# Patient Record
Sex: FEMALE | Race: WHITE | Employment: UNEMPLOYED | URBAN - METROPOLITAN AREA
[De-identification: names, ages, dates, MRNs, and addresses within clinical notes are randomized per-mention and may not be internally consistent; named-entity substitution may affect disease eponyms.]

---

## 2019-06-14 ENCOUNTER — TRANSFERRED RECORDS (OUTPATIENT)
Dept: HEALTH INFORMATION MANAGEMENT | Facility: CLINIC | Age: 39
End: 2019-06-14

## 2019-07-16 ENCOUNTER — TRANSFERRED RECORDS (OUTPATIENT)
Dept: HEALTH INFORMATION MANAGEMENT | Facility: CLINIC | Age: 39
End: 2019-07-16

## 2019-08-06 ENCOUNTER — TRANSFERRED RECORDS (OUTPATIENT)
Dept: HEALTH INFORMATION MANAGEMENT | Facility: CLINIC | Age: 39
End: 2019-08-06

## 2019-08-19 ENCOUNTER — PRE VISIT (OUTPATIENT)
Dept: ORTHOPEDICS | Facility: CLINIC | Age: 39
End: 2019-08-19

## 2019-08-19 NOTE — TELEPHONE ENCOUNTER
RECORDS RECEIVED FROM: Left Knee revision partial//left knee pain//ref by Dr. Kent//[facility] November 2018/Bone Scan of the knee. recs faxed to HIM 8/16 se   DATE RECEIVED: 8/19   NOTES STATUS DETAILS   OFFICE NOTE from referring provider Care everywhere    OFFICE NOTE from other specialist Internal    DISCHARGE SUMMARY from hospital N/A    DISCHARGE REPORT from the ER N/A    OPERATIVE REPORT Care Everywhere 11/23/18    MEDICATION LIST Internal    MRI internal    CT SCAN n/a    XRAYS (IMAGES & REPORTS) recieved [facility]  2019 2018 2014 2007

## 2019-09-10 DIAGNOSIS — M17.12 ARTHRITIS OF LEFT KNEE: Primary | ICD-10-CM

## 2019-09-11 ENCOUNTER — ANCILLARY PROCEDURE (OUTPATIENT)
Dept: GENERAL RADIOLOGY | Facility: CLINIC | Age: 39
End: 2019-09-11
Attending: ORTHOPAEDIC SURGERY
Payer: COMMERCIAL

## 2019-09-11 ENCOUNTER — OFFICE VISIT (OUTPATIENT)
Dept: ORTHOPEDICS | Facility: CLINIC | Age: 39
End: 2019-09-11
Payer: COMMERCIAL

## 2019-09-11 VITALS — BODY MASS INDEX: 30.52 KG/M2 | HEIGHT: 71 IN | WEIGHT: 218 LBS

## 2019-09-11 DIAGNOSIS — M17.12 ARTHRITIS OF LEFT KNEE: ICD-10-CM

## 2019-09-11 DIAGNOSIS — Z96.652 HISTORY OF LEFT KNEE REPLACEMENT: Primary | ICD-10-CM

## 2019-09-11 RX ORDER — DULAGLUTIDE 1.5 MG/.5ML
INJECTION, SOLUTION SUBCUTANEOUS
Refills: 6 | COMMUNITY
Start: 2019-08-20

## 2019-09-11 RX ORDER — PROPRANOLOL HYDROCHLORIDE 10 MG/1
10 TABLET ORAL
COMMUNITY
Start: 2016-12-07

## 2019-09-11 RX ORDER — DOXYCYCLINE 100 MG/1
CAPSULE ORAL
Refills: 0 | COMMUNITY
Start: 2019-08-27

## 2019-09-11 ASSESSMENT — ENCOUNTER SYMPTOMS
HOARSE VOICE: 1
DOUBLE VISION: 0
SINUS PAIN: 1
ARTHRALGIAS: 1
EYE PAIN: 1
COUGH DISTURBING SLEEP: 1
POSTURAL DYSPNEA: 0
EYE WATERING: 0
HEMOPTYSIS: 0
WHEEZING: 1
TROUBLE SWALLOWING: 0
JOINT SWELLING: 1
COUGH: 1
NECK MASS: 0
SMELL DISTURBANCE: 1
NECK PAIN: 1
SPUTUM PRODUCTION: 0
MUSCLE WEAKNESS: 1
MUSCLE CRAMPS: 1
DYSPNEA ON EXERTION: 0
SINUS CONGESTION: 0
STIFFNESS: 1
BACK PAIN: 1
SNORES LOUDLY: 0
MYALGIAS: 1
SHORTNESS OF BREATH: 0
SORE THROAT: 0
TASTE DISTURBANCE: 0

## 2019-09-11 ASSESSMENT — KOOS JR: HOW SEVERE IS YOUR KNEE STIFFNESS AFTER FIRST WAKING IN MORNING: SEVERE

## 2019-09-11 ASSESSMENT — ACTIVITIES OF DAILY LIVING (ADL): FUNCTION,_DAILY_LIVING_SCORE: 26.47

## 2019-09-11 ASSESSMENT — MIFFLIN-ST. JEOR: SCORE: 1769.97

## 2019-09-11 NOTE — NURSING NOTE
"Reason For Visit:   Chief Complaint   Patient presents with     Consult     Left knee pain after surgery. Revision partial knee.        Primary MD: Yessica Toledo  Referring MD: Robert Kent MD          Ht 1.803 m (5' 11\")   Wt 98.9 kg (218 lb)   LMP 05/18/2012   BMI 30.40 kg/m      Pain Assessment  Patient Currently in Pain: Yes  0-10 Pain Scale: 7  Primary Pain Location: Knee  Pain Descriptors: Discomfort, Tightness, Sharp    Current Outpatient Medications   Medication     acetaminophen (TYLENOL) 500 MG tablet     blood glucose monitoring (JD MICROLET) lancets     blood glucose test strip     gabapentin (NEURONTIN) 600 MG tablet     glucagon (GLUCAGON EMERGENCY) 1 MG injection     hydrOXYzine (VISTARIL) 25 MG capsule     ibuprofen (ADVIL,MOTRIN) 200 MG tablet     insulin glargine (LANTUS SOLOSTAR) 100 UNIT/ML PEN     insulin lispro (HUMALOG VIAL) 100 UNIT/ML soln     insulin pen needle 31G X 8 MM     insulin syringes, disposable, U-100 1 ML MISC     lisinopril (PRINIVIL,ZESTRIL) 2.5 MG tablet     metformin (GLUMETZA) 1000 MG (MOD) 24 hr tablet     metoclopramide (REGLAN) 10 MG tablet     ondansetron (ZOFRAN) 4 MG tablet     topiramate (TOPAMAX) 25 MG tablet     buPROPion (WELLBUTRIN) 75 MG tablet     Hyoscyamine Sulfate (LEVSIN PO)     insulin glargine (LANTUS VIAL) 100 UNIT/ML soln     opium-belladonna (B&O SUPPRETTES) 30-16.2 MG suppository     Phenazopyridine HCl (PYRIDIUM PO)     risperidone (RISPERDAL) 0.25 MG tablet     SUMAtriptan (IMITREX) 100 MG tablet     tamsulosin (FLOMAX) 0.4 MG 24 hr capsule     TRULICITY 1.5 MG/0.5ML pen     No current facility-administered medications for this visit.           Allergies   Allergen Reactions     Bee Venom Anaphylaxis     Bactrim [Sulfamethoxazole W/Trimethoprim] Hives     Penicillin G Hives     Sulfa Drugs Hives     Zoloft [Sertraline Hcl] Hives     Compazine [Prochlorperazine] Anxiety     12/12/2014 States \"not anymore\"           Georgina " TRISTAN Higuera

## 2019-09-11 NOTE — PROGRESS NOTES
[facility], Orthopaedic Surgery, Arthritis, Hip and Knee Replacement    Kimberly Webber MRN# [id number]   Age: 38 year old YOB: 1980     Requesting physician: Robert Kent MD              History of Present Illness:   Kimberly Webber is a 38 year old year old female who presents today for evaluation and management of left painful knee replacement - 2018 in [place].    She has had many issues with her left knee replacement.  She initially underwent a patellofemoral arthroplasty in 2014 in [place].  This was converted to a total knee replacement in 2018.  She notes that initially she did well following the knee replacement.  Then about 1 month after the knee replacement she sustained a fall onto the knee.  She has had pain in that setting since that time.  She had multiple aspirations that did not show any infection.  She had a bone scan obtained and [place] but she is not certain of those results.  She localizes the pain diffusely throughout the knee.  She has persistent swelling in the knee.  She has difficulty walking due to the pain in the knee.         Past Medical History:     Patient Active Problem List   Diagnosis     Chondromalacia of patella     Diabetes mellitus (H)     Renal stone     Abdominal pain     Acute post-operative pain     Arthritis of left knee     GARCIA (nonalcoholic steatohepatitis)     Abdominal pain, epigastric     Nausea with vomiting     Intractable abdominal pain     Interstitial cystitis     Bipolar affective disorder (H)     Hypertension     Past Medical History:   Diagnosis Date     Anxiety      Bipolar 2 disorder (H)      Chronic back pain      Chronic pain      Depression      Diabetes mellitus (H)     type 2     Gastro-oesophageal reflux disease      Hyperlipidemia      Migraines      Nephrolithiasis      Obesity      PTSD (post-traumatic stress disorder)      Seasonal allergies      UTI (lower urinary tract infection)      Prior history of blood clot: yes,  FVL - prior DVT  Prior history of bleeding problems: none  Prior history of anesthetic complications: none  Currently on opioids:  none  History of Diabetes (if so, recent A1c): yes - 7.2 a1c           Past Surgical History:     Past Surgical History:   Procedure Laterality Date     APPENDECTOMY       ARTHROPLASTY PATELLO-FEMORAL (KNEE)  5/27/2011    Procedure:ARTHROPLASTY PATELLO-FEMORAL (KNEE); Right Knee Arthroscopy, Patella Resurfacing; Surgeon:MYRA HAMILTON; [facility]     ARTHROPLASTY PATELLO-FEMORAL (KNEE)  5/18/2012    Procedure:ARTHROPLASTY PATELLO-FEMORAL (KNEE); Right Knee Diagnostic Arthroscopy, Right Knee Guerline with Femoral SurfaceOnly; Osteochondral Autograft x2; Lateral Retinacular Lengthening         ; Surgeon:MYRA HAMILTON; [facility]     ARTHROPLASTY PATELLO-FEMORAL (KNEE)  8/23/2013    Procedure: ARTHROPLASTY PATELLO-FEMORAL (KNEE);  left knee diagnostic arthroscopy, patellofemoral arthroplasty;  Surgeon: Myra Hamilton MD;  [facility]     ARTHROSCOPY KNEE  8/23/2013    Procedure: ARTHROSCOPY KNEE;;  Surgeon: Myra Hamilton MD;  [facility]     ARTHROSCOPY SHOULDER ROTATOR CUFF REPAIR      right     CHOLECYSTECTOMY       COLONOSCOPY       cystoureteroscopy[       GENITOURINARY SURGERY  10/24/2014    cystoscopy     HYSTERECTOMY       KNEE SURGERY      11 surgeries (9 on right, 2 on left)     MYRINGOTOMY       NISSEN FUNDOPLICATION       RELEASE CARPAL TUNNEL      right     upper gastrointestinal endoscopy[              Social History:     Social History     Socioeconomic History     Marital status:      Spouse name: Not on file     Number of children: Not on file     Years of education: Not on file     Highest education level: Not on file   Occupational History     Not on file   Social Needs     Financial resource strain: Not on file     Food insecurity:     Worry: Not on file     Inability: Not on file     Transportation needs:     Medical: Not on  "file     Non-medical: Not on file   Tobacco Use     Smoking status: Current Every Day Smoker     Packs/day: 1.00     Years: 10.00     Pack years: 10.00     Types: Cigarettes     Smokeless tobacco: Never Used   Substance and Sexual Activity     Alcohol use: No     Drug use: No     Sexual activity: Not on file   Lifestyle     Physical activity:     Days per week: Not on file     Minutes per session: Not on file     Stress: Not on file   Relationships     Social connections:     Talks on phone: Not on file     Gets together: Not on file     Attends Taoism service: Not on file     Active member of club or organization: Not on file     Attends meetings of clubs or organizations: Not on file     Relationship status: Not on file     Intimate partner violence:     Fear of current or ex partner: Not on file     Emotionally abused: Not on file     Physically abused: Not on file     Forced sexual activity: Not on file   Other Topics Concern     Not on file   Social History Narrative     Not on file       Smoking: Smoking 1 PPD  Lives in [place]           Family History:     No family history on file.           Medications:     Current Outpatient Medications   Medication Sig     acetaminophen (TYLENOL) 500 MG tablet      blood glucose monitoring (JD MICROLET) lancets USE TO TEST BLOOD GLUCOSE FOUR TIMES A DAY     blood glucose test strip Contour monitor strips, test before meals and bedtime.     gabapentin (NEURONTIN) 600 MG tablet Take 1 tablet 3 times daily     glucagon (GLUCAGON EMERGENCY) 1 MG injection INJECT 1 MG INTO THE MUSCLE ONE TIME AS NEEDED FOR LOW BLOOD SUGAR.     hydrOXYzine (VISTARIL) 25 MG capsule Take 25 mg by mouth     ibuprofen (ADVIL,MOTRIN) 200 MG tablet      insulin glargine (LANTUS SOLOSTAR) 100 UNIT/ML PEN      insulin lispro (HUMALOG VIAL) 100 UNIT/ML soln      insulin pen needle 31G X 8 MM      insulin syringes, disposable, U-100 1 ML MISC 31 gauge, 5/16\" needle, One injection daily     " lisinopril (PRINIVIL,ZESTRIL) 2.5 MG tablet Take 2.5 mg by mouth 2 times daily      metformin (GLUMETZA) 1000 MG (MOD) 24 hr tablet Take 1,000 mg by mouth 2 times daily.     metoclopramide (REGLAN) 10 MG tablet Take 1 tablet (10 mg) by mouth 3 times daily as needed (nausea and vomiting)     ondansetron (ZOFRAN) 4 MG tablet Take 1 tablet (4 mg) by mouth every 8 hours as needed for nausea     propranolol (INDERAL) 10 MG tablet Take 10 mg by mouth     topiramate (TOPAMAX) 25 MG tablet Take 75 mg by mouth 2 times daily     buPROPion (WELLBUTRIN) 75 MG tablet Take 300 mg by mouth daily      doxycycline monohydrate (MONODOX) 100 MG capsule TAKE ONE (1) Capsule BY MOUTH TWICE DAILY FOR 7 DAYS     Hyoscyamine Sulfate (LEVSIN PO) Take 0.125 mg by mouth every 4 hours as needed     insulin glargine (LANTUS VIAL) 100 UNIT/ML soln Inject 85 Units Subcutaneous     opium-belladonna (B&O SUPPRETTES) 30-16.2 MG suppository Place 1 suppository (30 mg) rectally every 6 hours as needed for moderate pain (Patient not taking: Reported on 9/11/2019)     Phenazopyridine HCl (PYRIDIUM PO) Take 200 mg by mouth 3 times daily as needed for irritation     risperidone (RISPERDAL) 0.25 MG tablet Take 4 mg by mouth every evening Patient states correct daily dose is 3 mg.     SUMAtriptan (IMITREX) 100 MG tablet TAKE 1 TABLET BY MOUTH ONE TIME AS NEEDED FOR MIGRAINE. DOSE MAY BE REPEATED AFTER 2 HOURS. DO NOT EXCEED 200 MG IN 24 HOURS     tamsulosin (FLOMAX) 0.4 MG 24 hr capsule Take by mouth daily     TRULICITY 1.5 MG/0.5ML pen Inject 1.5 mg under the skin one time a week.     No current facility-administered medications for this visit.             Review of Systems:   A comprehensive 10 point review of systems (constitutional, ENT, cardiac, peripheral vascular, lymphatic, respiratory, GI, , Musculoskeletal, skin, Neurological) was performed and found to be negative except as described in this note.     Also see intake form completed by  "patient.             Physical Exam:     EXAMINATION pertinent findings:   VITAL SIGNS: Height 1.803 m (5' 11\"), weight 98.9 kg (218 lb), last menstrual period 05/18/2012.  Body mass index is 30.4 kg/m .  GEN: AOx3, cooperative, no distress  RESP: non labored breathing   ABD: benign   SKIN: grossly normal   LYMPHATIC: grossly normal   NEURO: grossly normal   VASCULAR: satisfactory perfusion of all extremities  MUSCULOSKELETAL:   She walks with an antalgic gait.  Examination of the left knee demonstrates a well-healed incision.  There is no surrounding redness.  The knee range of motion is from 10 to 95 degrees.  She has mild pain with knee range of motion.  She has diffuse knee tenderness to palpation but no specific proximal tibial or distal femoral tenderness.  Her knee is otherwise stable to varus and valgus stress.  She has no pain with hip range of motion.  Extensor mechanism is fully intact with no extensor lag.             Data:   Imaging:   X-rays demonstrate well fixed well-positioned left cemented knee replacement implants.  Right knee replacement also well fixed well-positioned with slight valgus alignment.           Assessment and Plan:   Assessment:  Kimberly is a very pleasant 38-year-old woman with a painful left knee replacement.  We discussed possible etiologies and possible management options.  At this point there is no concern for infection based on the aspiration results in care everywhere.  There is no evidence of loosening on the plain radiographs.  We discussed that a bone scan would be slightly unreliable in the setting given that.  Recommended she continue to work on strengthening and stretching.  I am hopeful that with time things will continue to get better.  If not I will see her back in clinic 2 years after surgery and we would consider repeating a bone scan.  At this point I do not believe that further surgery would provide any benefit and may in fact make her worse without a clear " pathology.  She will let us know if she has any questions or concerns from today's visit.      Jackson Godinez M.D.     Arthritis and Joint Replacement  Department of Orthopaedic Surgery, [facility]  [email or web address]  [phone] (pager)           Review of Systems:       Answers for HPI/ROS submitted by the patient on 9/11/2019   General Symptoms: No  Skin Symptoms: No  HENT Symptoms: Yes  EYE SYMPTOMS: Yes  HEART SYMPTOMS: No  LUNG SYMPTOMS: Yes  INTESTINAL SYMPTOMS: No  URINARY SYMPTOMS: No  GYNECOLOGIC SYMPTOMS: No  BREAST SYMPTOMS: No  SKELETAL SYMPTOMS: Yes  BLOOD SYMPTOMS: No  NERVOUS SYSTEM SYMPTOMS: No  MENTAL HEALTH SYMPTOMS: No  Ear pain: No  Ear discharge: No  Hearing loss: No  Tinnitus: No  Nosebleeds: No  Congestion: No  Sinus pain: Yes  Trouble swallowing: No   Voice hoarseness: Yes  Mouth sores: No  Sore throat: No  Tooth pain: No  Gum tenderness: No  Bleeding gums: No  Change in taste: No  Change in sense of smell: Yes  Dry mouth: No  Hearing aid used: No  Neck lump: No  Eye pain: Yes  Vision loss: No  Dry eyes: Yes  Watery eyes: No  Eye bulging: Yes  Double vision: No  Flashing of lights: No  Spots: No  Cough: Yes  Sputum or phlegm: No  Coughing up blood: No  Difficulty breating or shortness of breath: No  Snoring: No  Wheezing: Yes  Difficulty breathing on exertion: No  Nighttime Cough: Yes  Difficulty breathing when lying flat: No  Back pain: Yes  Muscle aches: Yes  Neck pain: Yes  Swollen joints: Yes  Joint pain: Yes  Bone pain: Yes  Muscle cramps: Yes  Muscle weakness: Yes  Joint stiffness: Yes  Bone fracture: No

## 2020-10-16 NOTE — LETTER
9/11/2019       RE: Kimberly Webber  [address] [address]     Dear Colleague,    Thank you for referring your patient, Kimberly Webber, to the [facility] at [facility]. Please see a copy of my visit note below.    [facility], Orthopaedic Surgery, Arthritis, Hip and Knee Replacement    Kimberly Webber MRN# [id number]   Age: 38 year old YOB: 1980     Requesting physician: Robert Kent MD              History of Present Illness:   Kimberly Webber is a 38 year old year old female who presents today for evaluation and management of left painful knee replacement - 2018 in [place].    She has had many issues with her left knee replacement.  She initially underwent a patellofemoral arthroplasty in 2014 in [place].  This was converted to a total knee replacement in 2018.  She notes that initially she did well following the knee replacement.  Then about 1 month after the knee replacement she sustained a fall onto the knee.  She has had pain in that setting since that time.  She had multiple aspirations that did not show any infection.  She had a bone scan obtained and [place] but she is not certain of those results.  She localizes the pain diffusely throughout the knee.  She has persistent swelling in the knee.  She has difficulty walking due to the pain in the knee.         Past Medical History:     Patient Active Problem List   Diagnosis     Chondromalacia of patella     Diabetes mellitus (H)     Renal stone     Abdominal pain     Acute post-operative pain     Arthritis of left knee     GARCIA (nonalcoholic steatohepatitis)     Abdominal pain, epigastric     Nausea with vomiting     Intractable abdominal pain     Interstitial cystitis     Bipolar affective disorder (H)     Hypertension     Past Medical History:   Diagnosis Date     Anxiety      Bipolar 2 disorder (H)      Chronic back pain      Chronic pain      Depression       Diabetes mellitus (H)     type 2     Gastro-oesophageal reflux disease      Hyperlipidemia      Migraines      Nephrolithiasis      Obesity      PTSD (post-traumatic stress disorder)      Seasonal allergies      UTI (lower urinary tract infection)      Prior history of blood clot: yes, FVL - prior DVT  Prior history of bleeding problems: none  Prior history of anesthetic complications: none  Currently on opioids:  none  History of Diabetes (if so, recent A1c): yes - 7.2 a1c           Past Surgical History:     Past Surgical History:   Procedure Laterality Date     APPENDECTOMY       ARTHROPLASTY PATELLO-FEMORAL (KNEE)  5/27/2011    Procedure:ARTHROPLASTY PATELLO-FEMORAL (KNEE); Right Knee Arthroscopy, Patella Resurfacing; Surgeon:MYRA HAMILTON; [facility]     ARTHROPLASTY PATELLO-FEMORAL (KNEE)  5/18/2012    Procedure:ARTHROPLASTY PATELLO-FEMORAL (KNEE); Right Knee Diagnostic Arthroscopy, Right Knee Alburtis with Femoral SurfaceOnly; Osteochondral Autograft x2; Lateral Retinacular Lengthening         ; Surgeon:MYRA HAMILTON; [facility]     ARTHROPLASTY PATELLO-FEMORAL (KNEE)  8/23/2013    Procedure: ARTHROPLASTY PATELLO-FEMORAL (KNEE);  left knee diagnostic arthroscopy, patellofemoral arthroplasty;  Surgeon: Myra Hamilton MD;  [facility]     ARTHROSCOPY KNEE  8/23/2013    Procedure: ARTHROSCOPY KNEE;;  Surgeon: Myra Hamilton MD;  [facility]     ARTHROSCOPY SHOULDER ROTATOR CUFF REPAIR      right     CHOLECYSTECTOMY       COLONOSCOPY       cystoureteroscopy[       GENITOURINARY SURGERY  10/24/2014    cystoscopy     HYSTERECTOMY       KNEE SURGERY      11 surgeries (9 on right, 2 on left)     MYRINGOTOMY       NISSEN FUNDOPLICATION       RELEASE CARPAL TUNNEL      right     upper gastrointestinal endoscopy[              Social History:     Social History     Socioeconomic History     Marital status:      Spouse name: Not on file     Number of children: Not on file      Years of education: Not on file     Highest education level: Not on file   Occupational History     Not on file   Social Needs     Financial resource strain: Not on file     Food insecurity:     Worry: Not on file     Inability: Not on file     Transportation needs:     Medical: Not on file     Non-medical: Not on file   Tobacco Use     Smoking status: Current Every Day Smoker     Packs/day: 1.00     Years: 10.00     Pack years: 10.00     Types: Cigarettes     Smokeless tobacco: Never Used   Substance and Sexual Activity     Alcohol use: No     Drug use: No     Sexual activity: Not on file   Lifestyle     Physical activity:     Days per week: Not on file     Minutes per session: Not on file     Stress: Not on file   Relationships     Social connections:     Talks on phone: Not on file     Gets together: Not on file     Attends Zoroastrian service: Not on file     Active member of club or organization: Not on file     Attends meetings of clubs or organizations: Not on file     Relationship status: Not on file     Intimate partner violence:     Fear of current or ex partner: Not on file     Emotionally abused: Not on file     Physically abused: Not on file     Forced sexual activity: Not on file   Other Topics Concern     Not on file   Social History Narrative     Not on file       Smoking: Smoking 1 PPD  Lives in [place]         Family History:   No family history on file.           Medications:     Current Outpatient Medications   Medication Sig     acetaminophen (TYLENOL) 500 MG tablet      blood glucose monitoring (JD MICROLET) lancets USE TO TEST BLOOD GLUCOSE FOUR TIMES A DAY     blood glucose test strip Contour monitor strips, test before meals and bedtime.     gabapentin (NEURONTIN) 600 MG tablet Take 1 tablet 3 times daily     glucagon (GLUCAGON EMERGENCY) 1 MG injection INJECT 1 MG INTO THE MUSCLE ONE TIME AS NEEDED FOR LOW BLOOD SUGAR.     hydrOXYzine (VISTARIL) 25 MG capsule Take 25 mg by mouth      "ibuprofen (ADVIL,MOTRIN) 200 MG tablet      insulin glargine (LANTUS SOLOSTAR) 100 UNIT/ML PEN      insulin lispro (HUMALOG VIAL) 100 UNIT/ML soln      insulin pen needle 31G X 8 MM      insulin syringes, disposable, U-100 1 ML MISC 31 gauge, 5/16\" needle, One injection daily     lisinopril (PRINIVIL,ZESTRIL) 2.5 MG tablet Take 2.5 mg by mouth 2 times daily      metformin (GLUMETZA) 1000 MG (MOD) 24 hr tablet Take 1,000 mg by mouth 2 times daily.     metoclopramide (REGLAN) 10 MG tablet Take 1 tablet (10 mg) by mouth 3 times daily as needed (nausea and vomiting)     ondansetron (ZOFRAN) 4 MG tablet Take 1 tablet (4 mg) by mouth every 8 hours as needed for nausea     propranolol (INDERAL) 10 MG tablet Take 10 mg by mouth     topiramate (TOPAMAX) 25 MG tablet Take 75 mg by mouth 2 times daily     buPROPion (WELLBUTRIN) 75 MG tablet Take 300 mg by mouth daily      doxycycline monohydrate (MONODOX) 100 MG capsule TAKE ONE (1) Capsule BY MOUTH TWICE DAILY FOR 7 DAYS     Hyoscyamine Sulfate (LEVSIN PO) Take 0.125 mg by mouth every 4 hours as needed     insulin glargine (LANTUS VIAL) 100 UNIT/ML soln Inject 85 Units Subcutaneous     opium-belladonna (B&O SUPPRETTES) 30-16.2 MG suppository Place 1 suppository (30 mg) rectally every 6 hours as needed for moderate pain (Patient not taking: Reported on 9/11/2019)     Phenazopyridine HCl (PYRIDIUM PO) Take 200 mg by mouth 3 times daily as needed for irritation     risperidone (RISPERDAL) 0.25 MG tablet Take 4 mg by mouth every evening Patient states correct daily dose is 3 mg.     SUMAtriptan (IMITREX) 100 MG tablet TAKE 1 TABLET BY MOUTH ONE TIME AS NEEDED FOR MIGRAINE. DOSE MAY BE REPEATED AFTER 2 HOURS. DO NOT EXCEED 200 MG IN 24 HOURS     tamsulosin (FLOMAX) 0.4 MG 24 hr capsule Take by mouth daily     TRULICITY 1.5 MG/0.5ML pen Inject 1.5 mg under the skin one time a week.     No current facility-administered medications for this visit.             Review of Systems:   A " "comprehensive 10 point review of systems (constitutional, ENT, cardiac, peripheral vascular, lymphatic, respiratory, GI, , Musculoskeletal, skin, Neurological) was performed and found to be negative except as described in this note.     Also see intake form completed by patient.         Physical Exam:     EXAMINATION pertinent findings:   VITAL SIGNS: Height 1.803 m (5' 11\"), weight 98.9 kg (218 lb), last menstrual period 05/18/2012.  Body mass index is 30.4 kg/m .  GEN: AOx3, cooperative, no distress  RESP: non labored breathing   ABD: benign   SKIN: grossly normal   LYMPHATIC: grossly normal   NEURO: grossly normal   VASCULAR: satisfactory perfusion of all extremities  MUSCULOSKELETAL:   She walks with an antalgic gait.  Examination of the left knee demonstrates a well-healed incision.  There is no surrounding redness.  The knee range of motion is from 10 to 95 degrees.  She has mild pain with knee range of motion.  She has diffuse knee tenderness to palpation but no specific proximal tibial or distal femoral tenderness.  Her knee is otherwise stable to varus and valgus stress.  She has no pain with hip range of motion.  Extensor mechanism is fully intact with no extensor lag.         Data:   Imaging:   X-rays demonstrate well fixed well-positioned left cemented knee replacement implants.  Right knee replacement also well fixed well-positioned with slight valgus alignment.         Assessment and Plan:   Assessment:  Kimberly is a very pleasant 38-year-old woman with a painful left knee replacement.  We discussed possible etiologies and possible management options.  At this point there is no concern for infection based on the aspiration results in care everywhere.  There is no evidence of loosening on the plain radiographs.  We discussed that a bone scan would be slightly unreliable in the setting given that.  Recommended she continue to work on strengthening and stretching.  I am hopeful that with time things will " continue to get better.  If not I will see her back in clinic 2 years after surgery and we would consider repeating a bone scan.  At this point I do not believe that further surgery would provide any benefit and may in fact make her worse without a clear pathology.  She will let us know if she has any questions or concerns from today's visit.    Jackson Godinez M.D.     Arthritis and Joint Replacement  Department of Orthopaedic Surgery, [facility]  [email or web address]  [phone] (pager)       Yes